# Patient Record
Sex: MALE | Race: WHITE | NOT HISPANIC OR LATINO | Employment: FULL TIME | URBAN - METROPOLITAN AREA
[De-identification: names, ages, dates, MRNs, and addresses within clinical notes are randomized per-mention and may not be internally consistent; named-entity substitution may affect disease eponyms.]

---

## 2021-03-26 DIAGNOSIS — R06.02 SOB (SHORTNESS OF BREATH): Primary | ICD-10-CM

## 2021-03-26 PROCEDURE — 94726 PLETHYSMOGRAPHY LUNG VOLUMES: CPT | Performed by: INTERNAL MEDICINE

## 2021-03-26 PROCEDURE — 94375 RESPIRATORY FLOW VOLUME LOOP: CPT | Performed by: INTERNAL MEDICINE

## 2021-03-26 PROCEDURE — 94729 DIFFUSING CAPACITY: CPT | Performed by: INTERNAL MEDICINE

## 2021-03-30 ENCOUNTER — OFFICE VISIT (OUTPATIENT)
Dept: ALLERGY | Facility: OTHER | Age: 56
End: 2021-03-30
Payer: COMMERCIAL

## 2021-03-30 ENCOUNTER — MYC MEDICAL ADVICE (OUTPATIENT)
Dept: ALLERGY | Facility: CLINIC | Age: 56
End: 2021-03-30

## 2021-03-30 ENCOUNTER — ANCILLARY PROCEDURE (OUTPATIENT)
Dept: GENERAL RADIOLOGY | Facility: OTHER | Age: 56
End: 2021-03-30
Attending: ALLERGY & IMMUNOLOGY
Payer: COMMERCIAL

## 2021-03-30 VITALS
HEIGHT: 70 IN | OXYGEN SATURATION: 98 % | BODY MASS INDEX: 27.92 KG/M2 | SYSTOLIC BLOOD PRESSURE: 120 MMHG | DIASTOLIC BLOOD PRESSURE: 84 MMHG | WEIGHT: 195 LBS | HEART RATE: 66 BPM

## 2021-03-30 DIAGNOSIS — J30.9 ALLERGIC RHINITIS, UNSPECIFIED SEASONALITY, UNSPECIFIED TRIGGER: ICD-10-CM

## 2021-03-30 DIAGNOSIS — R06.02 SHORTNESS OF BREATH: Primary | ICD-10-CM

## 2021-03-30 DIAGNOSIS — R13.19 OTHER DYSPHAGIA: ICD-10-CM

## 2021-03-30 DIAGNOSIS — R06.02 SHORTNESS OF BREATH: ICD-10-CM

## 2021-03-30 LAB
DLCOUNC-%PRED-PRE: 115 %
DLCOUNC-PRE: 33.8 ML/MIN/MMHG
DLCOUNC-PRED: 29.3 ML/MIN/MMHG
ERV-%PRED-PRE: 26 %
ERV-PRE: 0.34 L
ERV-PRED: 1.26 L
EXPTIME-PRE: 6.68 SEC
FEF2575-%PRED-PRE: 106 %
FEF2575-PRE: 3.66 L/SEC
FEF2575-PRED: 3.45 L/SEC
FEFMAX-%PRED-PRE: 97 %
FEFMAX-PRE: 9.53 L/SEC
FEFMAX-PRED: 9.79 L/SEC
FEV1-%PRED-PRE: 90 %
FEV1-PRE: 3.55 L
FEV1FEV6-PRE: 82 %
FEV1FEV6-PRED: 80 %
FEV1FVC-PRE: 84 %
FEV1FVC-PRED: 79 %
FEV1SVC-PRE: 77 %
FEV1SVC-PRED: 74 %
FIFMAX-PRE: 6.56 L/SEC
FRCPLETH-%PRED-PRE: 76 %
FRCPLETH-PRE: 2.75 L
FRCPLETH-PRED: 3.58 L
FVC-%PRED-PRE: 84 %
FVC-PRE: 4.2 L
FVC-PRED: 4.98 L
IC-%PRED-PRE: 107 %
IC-PRE: 4.29 L
IC-PRED: 3.98 L
RVPLETH-%PRED-PRE: 105 %
RVPLETH-PRE: 2.41 L
RVPLETH-PRED: 2.28 L
TLCPLETH-%PRED-PRE: 97 %
TLCPLETH-PRE: 7.04 L
TLCPLETH-PRED: 7.23 L
VA-%PRED-PRE: 100 %
VA-PRE: 6.77 L
VC-%PRED-PRE: 88 %
VC-PRE: 4.62 L
VC-PRED: 5.24 L

## 2021-03-30 PROCEDURE — 71046 X-RAY EXAM CHEST 2 VIEWS: CPT | Performed by: RADIOLOGY

## 2021-03-30 PROCEDURE — 99204 OFFICE O/P NEW MOD 45 MIN: CPT | Performed by: ALLERGY & IMMUNOLOGY

## 2021-03-30 RX ORDER — LACTOBACILLUS RHAMNOSUS GG 10B CELL
1 CAPSULE ORAL 2 TIMES DAILY
COMMUNITY

## 2021-03-30 RX ORDER — ALBUTEROL SULFATE 90 UG/1
AEROSOL, METERED RESPIRATORY (INHALATION)
COMMUNITY
Start: 2020-11-07

## 2021-03-30 RX ORDER — IBUPROFEN 200 MG
TABLET ORAL
COMMUNITY
Start: 2020-10-01

## 2021-03-30 RX ORDER — POLYETHYLENE GLYCOL 3350 17 G/17G
1 POWDER, FOR SOLUTION ORAL DAILY
COMMUNITY

## 2021-03-30 RX ORDER — CETIRIZINE HYDROCHLORIDE 10 MG/1
10 TABLET ORAL
COMMUNITY

## 2021-03-30 RX ORDER — ALBUTEROL SULFATE 0.83 MG/ML
2.5 SOLUTION RESPIRATORY (INHALATION) EVERY 4 HOURS PRN
Qty: 90 ML | Refills: 3 | Status: SHIPPED | OUTPATIENT
Start: 2021-03-30

## 2021-03-30 ASSESSMENT — MIFFLIN-ST. JEOR: SCORE: 1735.76

## 2021-03-30 NOTE — LETTER
3/30/2021         RE: Cody Swift  [address] [address]        Dear Colleague,    Thank you for referring your patient, Cody Swift, to the [facility]. Please see a copy of my visit note below.    Cody Swift is a 55 year old White male with previous medical history significant for hernia, allergic rhinitis and asthma. Cody Swift is being seen today for evaluation of asthma, seasonal allergies and dysphagia. The patient is accompanied by spouse. The spouse helped provide the history.       Historically in the first part of the year the patient has had hoarseness and shortness of breath.  Symptoms will persist for 3 months approximately and then resolve.  He will have associated dry cough.  Denies wheezing.  Has used albuterol for cough and shortness of breath and this has been effective for approximately 2 hours.  He denies postnasal drainage, congestion, sneezing, sinus pressure.  He takes Zyrtec daily.  Historically he has had perennial nasal allergy symptoms.  Prior allergy testing was done numerous years ago which was positive for dust mites, molds, trees, grasses, weeds, dogs.  Has been on allergen immunotherapy.  Currently he feels like Zyrtec controls the majority of his symptoms.  Chest x-ray in fall 2020 which was normal.  He had Covid in fall 2020.  He has felt fatigued.  No use of ICS or ICS/LABA.  He did have complete pulmonary function studies which were normal.  Bronchodilator was not given.  Lastly 1-2 times per week he will have a sensation of difficulty swallowing and he will have to consume water to get food down.  He does not take anything for acid reflux.    ENVIRONMENTAL HISTORY: [place] The home is heated with a forced air. They do have central air conditioning. The patient's bedroom is furnished with carpeting in bedroom.  Pets inside the house include 0  pets, only pet fish. There is no history of cockroach or mice infestation. There is/are 0 smokers in the house.  The house does not have a damp basement.     History reviewed. No pertinent past medical history.  History reviewed. No pertinent family history.  History reviewed. No pertinent surgical history.    REVIEW OF SYSTEMS:  General: negative for weight gain. negative for weight loss. negative for changes in sleep.   Ears: negative for fullness. negative for hearing loss. negative for dizziness.   Nose: negative for snoring.negative for changes in smell. negative for drainage.   Eyes: negative for eye watering. negative for eye itching. negative for vision changes. negative for eye redness.  Throat: positive  for hoarseness. negative for sore throat. negative for trouble swallowing.   Lungs: positive  for shortness of breath.negative for wheezing. negative for sputum production.   Cardiovascular: negative for chest pain. negative for swelling of ankles. negative for fast or irregular heartbeat.   Gastrointestinal: negative for nausea. negative for heartburn. negative for acid reflux.   Musculoskeletal: negative for joint pain. negative for joint stiffness. negative for joint swelling.   Neurologic: negative for seizures. negative for fainting. negative for weakness.   Psychiatric: negative for changes in mood. negative for anxiety.   Endocrine: negative for cold intolerance. negative for heat intolerance. negative for tremors.   Lymphatic: negative for lower extremity swelling. negative for lymph node swelling.   Hematologic: negative for easy bruising. negative for easy bleeding.  Integumentary: negative for rash. negative for scaling. negative for nail changes.       Current Outpatient Medications:      albuterol (PROAIR HFA/PROVENTIL HFA/VENTOLIN HFA) 108 (90 Base) MCG/ACT inhaler, INHALE 1 TO 2 PUFFS BY MOUTH EVERY 4 HOURS AS NEEDED FOR SHORTNESS OF BREATH AND FOR COUGH OR WITH EXERCISE, Disp: , Rfl:       albuterol (PROVENTIL) (2.5 MG/3ML) 0.083% neb solution, Take 1 vial (2.5 mg) by nebulization every 4 hours as needed for shortness of breath / dyspnea or wheezing, Disp: 90 mL, Rfl: 3     cetirizine (ZYRTEC) 10 MG tablet, Take 10 mg by mouth, Disp: , Rfl:      CHLOROPHYLL PO, , Disp: , Rfl:      fluticasone-vilanterol (BREO ELLIPTA) 200-25 MCG/INH inhaler, Inhale 1 puff into the lungs daily, Disp: 1 each, Rfl: 3     ibuprofen (ADVIL/MOTRIN) 200 MG tablet, , Disp: , Rfl:      lactobacillus rhamnosus, GG, (CULTURELL) capsule, Take 1 capsule by mouth 2 times daily, Disp: , Rfl:      MAGNESIUM PO, , Disp: , Rfl:      MELATONIN PO, , Disp: , Rfl:      NEW MED, BL[facility]- young living, Disp: , Rfl:      polyethylene glycol (MIRALAX) 17 g packet, Take 1 packet by mouth daily, Disp: , Rfl:      psyllium (METAMUCIL) 28.3 % packet, Take 1 packet by mouth daily, Disp: , Rfl:      vitamin D3 (CHOLECALCIFEROL) 125 MCG (5000 UT) tablet, Take 5,000 Units by mouth, Disp: , Rfl:   Immunization History   Administered Date(s) Administered     HepA-Adult 06/16/2008, 04/27/2009     Influenza (IIV3) PF 10/03/2007     OPV, trivalent, live 08/04/1980     Allergies   Allergen Reactions     Penicillins Anaphylaxis and Shortness Of Breath     Seasonal Allergies          EXAM:   Constitutional:  Appears well-developed and well-nourished. No distress.   HEENT:   Head: Normocephalic.   Nasal tissue pink and normal appearing.  No cobblestoning of posterior oropharynx.   No rhinorrhea noted.    Eyes: Conjunctivae are non-erythematous   No maxillary or frontal sinus tenderness to palpation.   Cardiovascular: Normal rate, regular rhythm and normal heart sounds. Exam reveals no gallop and no friction rub.   No murmur heard.  Respiratory: Effort normal and breath sounds normal. No respiratory distress. No wheezes. No rales.   Musculoskeletal: Normal range of motion.   Neuro: Oriented to person, place, and time.  Skin: Skin is warm and dry. No rash noted.    Psychiatric: Normal mood and affect.     Nursing note and vitals reviewed.    ASSESSMENT/PLAN:  Problem List Items Addressed This Visit        Respiratory    Shortness of breath - Primary     Recent shortness of breath, coughing.  Occurs for multiple years starting in January for approximately 3 months.  No clear triggers.  Albuterol has been used via nebulizer and helpful for approximately 2 hours.  Had asthma in childhood.  Had allergic rhinoconjunctivitis previously.  Chest x-ray and fall was normal.  PFTs recently done normal.    -Repeat chest x-ray.  -Trial of Breo 200/25 mcg 1 puff inhaled daily.  Doing this as family thinks albuterol has been somewhat beneficial despite normal PFT.  -Albuterol 2-4 puffs inhaled (use a spacer unless using a Proair Respiclick device) every 4 hours as needed for chest tightness, wheezing, shortness of breath and/or coughing.   -If he remains symptomatic would trial treatment with PPI and sent for EGD.  Discussed EGD with patient in depth today given difficulty swallowing but they declined at the moment.         Relevant Medications    albuterol (PROAIR HFA/PROVENTIL HFA/VENTOLIN HFA) 108 (90 Base) MCG/ACT inhaler    cetirizine (ZYRTEC) 10 MG tablet    fluticasone-vilanterol (BREO ELLIPTA) 200-25 MCG/INH inhaler    albuterol (PROVENTIL) (2.5 MG/3ML) 0.083% neb solution    Other Relevant Orders    CHEST X-RAY 2 VW [[id number]]    Allergic rhinitis, unspecified seasonality, unspecified trigger     Historically perennial allergy symptoms.  Well controlled with Zyrtec.  Allergy testing years ago positive for dust mites, molds, trees, grass, weeds and dogs.  No recent testing.  Offered repeat testing.  They will potentially get off antihistamines and repeat testing when they return in 4 weeks.  Otherwise use Zyrtec daily.         Relevant Medications    albuterol (PROAIR HFA/PROVENTIL HFA/VENTOLIN HFA) 108 (90 Base) MCG/ACT inhaler    cetirizine (ZYRTEC) 10 MG tablet     fluticasone-vilanterol (BREO ELLIPTA) 200-25 MCG/INH inhaler    albuterol (PROVENTIL) (2.5 MG/3ML) 0.083% neb solution       Digestive    Other dysphagia     1-2 times per week difficulty swallowing solid foods.  Food passes with water.  They will trial Tums.  Discussed EGD and they will consider.               Chart documentation with Dragon Voice recognition Software. Although reviewed after completion, some words and grammatical errors may remain.    Homero Henry DO FAAAAI  Medical Director for Allergy/Immunology at [place], [place]        Again, thank you for allowing me to participate in the care of your patient.        Sincerely,        Homero Henry DO

## 2021-03-30 NOTE — PATIENT INSTRUCTIONS
Allergy Staff Appt Hours Shot Hours Locations    Physician     Homero Henry DO       Support Staff     ALEXANDRE Obando CMA  Tuesday:        [place] 7-5 Wednesday:        [place]: 7-5 Thursday:                    [place] 7-6     Friday:  [place]  7-2   [place]        Thursday: 8-5:20        Friday: 7-12     [place]        Tuesday: 7- 3:20 Wednesday: 7-4:20 [place] Monday: 7-2:20 Tuesday: 9-5:20         [facility]  [address] [place] [address]  Appt Line: [phone]  Allergy RN:  [phone]    [facility]  [address] [place] [address]  Appt Line: [phone]  Allergy RN:  [phone]       Important Scheduling Information  Aspirin Desensitization: Appt will last 2 clinic days. Please call the Allergy RN line for your clinic to schedule. Discontinue antihistamines 7 days prior to the appointment.     Food Challenges: Appt will last 3-4 hours. Please call the Allergy RN line for your clinic to schedule. Discontinue antihistamines 7 days prior to the appointment.     Penicillin Testing: Appt will last 2-3 hours. Please call the Allergy RN line for your clinic to schedule. Discontinue antihistamines 7 days prior to the appointment.     Skin Testing: Appt will about 40 minutes. Call the appointment line for your clinic to schedule. Discontinue antihistamines 7 days prior to the appointment.     Venom Testing: Appt will last 2-3 hours. Please call the Allergy RN line for your clinic to schedule. Discontinue antihistamines 7 days prior to the appointment.     Thank you for trusting us with your Allergy, Asthma, and Immunology care. Please feel free to contact us with any questions or concerns you may have.      - Breo 200/25mcg 1 puff inhaled daily.  - Albuterol 2-4 puffs inhaled (use a spacer unless using a Proair Respiclick device) every 4 hours as needed for chest tightness, wheezing, shortness of breath and/or coughing.   -  Albuterol nebulized treatment every 4 hours as needed for chest tightness, wheezing, coughing and/or shortness of breath.   - Continue Zyrtec 10mg daily. Hold if you want allergy testing.   - Chest x-ray today.

## 2021-03-30 NOTE — RESULT ENCOUNTER NOTE
Chest x-ray looked okay. No evidence of pneumonia. Atelectasis is generally just an area of a little bit of small collapsed lung tissue. Plan remains the same as discussed in clinic. Thanks.     Dr. Henry

## 2021-03-30 NOTE — PROGRESS NOTES
Cody Swift is a 55 year old White male with previous medical history significant for hernia, allergic rhinitis and asthma. Cody Swift is being seen today for evaluation of asthma, seasonal allergies and dysphagia. The patient is accompanied by spouse. The spouse helped provide the history.       Historically in the first part of the year the patient has had hoarseness and shortness of breath.  Symptoms will persist for 3 months approximately and then resolve.  He will have associated dry cough.  Denies wheezing.  Has used albuterol for cough and shortness of breath and this has been effective for approximately 2 hours.  He denies postnasal drainage, congestion, sneezing, sinus pressure.  He takes Zyrtec daily.  Historically he has had perennial nasal allergy symptoms.  Prior allergy testing was done numerous years ago which was positive for dust mites, molds, trees, grasses, weeds, dogs.  Has been on allergen immunotherapy.  Currently he feels like Zyrtec controls the majority of his symptoms.  Chest x-ray in fall 2020 which was normal.  He had Covid in fall 2020.  He has felt fatigued.  No use of ICS or ICS/LABA.  He did have complete pulmonary function studies which were normal.  Bronchodilator was not given.  Lastly 1-2 times per week he will have a sensation of difficulty swallowing and he will have to consume water to get food down.  He does not take anything for acid reflux.    ENVIRONMENTAL HISTORY: [place] The home is heated with a forced air. They do have central air conditioning. The patient's bedroom is furnished with carpeting in bedroom.  Pets inside the house include 0 pets, only pet fish. There is no history of cockroach or mice infestation. There is/are 0 smokers in the house.  The house does not have a damp basement.     History reviewed. No pertinent past medical history.  History reviewed. No pertinent family history.  History  reviewed. No pertinent surgical history.    REVIEW OF SYSTEMS:  General: negative for weight gain. negative for weight loss. negative for changes in sleep.   Ears: negative for fullness. negative for hearing loss. negative for dizziness.   Nose: negative for snoring.negative for changes in smell. negative for drainage.   Eyes: negative for eye watering. negative for eye itching. negative for vision changes. negative for eye redness.  Throat: positive  for hoarseness. negative for sore throat. negative for trouble swallowing.   Lungs: positive  for shortness of breath.negative for wheezing. negative for sputum production.   Cardiovascular: negative for chest pain. negative for swelling of ankles. negative for fast or irregular heartbeat.   Gastrointestinal: negative for nausea. negative for heartburn. negative for acid reflux.   Musculoskeletal: negative for joint pain. negative for joint stiffness. negative for joint swelling.   Neurologic: negative for seizures. negative for fainting. negative for weakness.   Psychiatric: negative for changes in mood. negative for anxiety.   Endocrine: negative for cold intolerance. negative for heat intolerance. negative for tremors.   Lymphatic: negative for lower extremity swelling. negative for lymph node swelling.   Hematologic: negative for easy bruising. negative for easy bleeding.  Integumentary: negative for rash. negative for scaling. negative for nail changes.       Current Outpatient Medications:      albuterol (PROAIR HFA/PROVENTIL HFA/VENTOLIN HFA) 108 (90 Base) MCG/ACT inhaler, INHALE 1 TO 2 PUFFS BY MOUTH EVERY 4 HOURS AS NEEDED FOR SHORTNESS OF BREATH AND FOR COUGH OR WITH EXERCISE, Disp: , Rfl:      albuterol (PROVENTIL) (2.5 MG/3ML) 0.083% neb solution, Take 1 vial (2.5 mg) by nebulization every 4 hours as needed for shortness of breath / dyspnea or wheezing, Disp: 90 mL, Rfl: 3     cetirizine (ZYRTEC) 10 MG tablet, Take 10 mg by mouth, Disp: , Rfl:       CHLOROPHYLL PO, , Disp: , Rfl:      fluticasone-vilanterol (BREO ELLIPTA) 200-25 MCG/INH inhaler, Inhale 1 puff into the lungs daily, Disp: 1 each, Rfl: 3     ibuprofen (ADVIL/MOTRIN) 200 MG tablet, , Disp: , Rfl:      lactobacillus rhamnosus, GG, (CULTURELL) capsule, Take 1 capsule by mouth 2 times daily, Disp: , Rfl:      MAGNESIUM PO, , Disp: , Rfl:      MELATONIN PO, , Disp: , Rfl:      [facility], [facility]- young living, Disp: , Rfl:      polyethylene glycol (MIRALAX) 17 g packet, Take 1 packet by mouth daily, Disp: , Rfl:      psyllium (METAMUCIL) 28.3 % packet, Take 1 packet by mouth daily, Disp: , Rfl:      vitamin D3 (CHOLECALCIFEROL) 125 MCG (5000 UT) tablet, Take 5,000 Units by mouth, Disp: , Rfl:   Immunization History   Administered Date(s) Administered     HepA-Adult 06/16/2008, 04/27/2009     Influenza (IIV3) PF 10/03/2007     OPV, trivalent, live 08/04/1980     Allergies   Allergen Reactions     Penicillins Anaphylaxis and Shortness Of Breath     Seasonal Allergies          EXAM:   Constitutional:  Appears well-developed and well-nourished. No distress.   HEENT:   Head: Normocephalic.   Nasal tissue pink and normal appearing.  No cobblestoning of posterior oropharynx.   No rhinorrhea noted.    Eyes: Conjunctivae are non-erythematous   No maxillary or frontal sinus tenderness to palpation.   Cardiovascular: Normal rate, regular rhythm and normal heart sounds. Exam reveals no gallop and no friction rub.   No murmur heard.  Respiratory: Effort normal and breath sounds normal. No respiratory distress. No wheezes. No rales.   Musculoskeletal: Normal range of motion.   Neuro: Oriented to person, place, and time.  Skin: Skin is warm and dry. No rash noted.   Psychiatric: Normal mood and affect.     Nursing note and vitals reviewed.    ASSESSMENT/PLAN:  Problem List Items Addressed This Visit        Respiratory    Shortness of breath - Primary     Recent shortness of breath, coughing.  Occurs for multiple years  starting in January for approximately 3 months.  No clear triggers.  Albuterol has been used via nebulizer and helpful for approximately 2 hours.  Had asthma in childhood.  Had allergic rhinoconjunctivitis previously.  Chest x-ray and fall was normal.  PFTs recently done normal.    -Repeat chest x-ray.  -Trial of Breo 200/25 mcg 1 puff inhaled daily.  Doing this as family thinks albuterol has been somewhat beneficial despite normal PFT.  -Albuterol 2-4 puffs inhaled (use a spacer unless using a Proair Respiclick device) every 4 hours as needed for chest tightness, wheezing, shortness of breath and/or coughing.   -If he remains symptomatic would trial treatment with PPI and sent for EGD.  Discussed EGD with patient in depth today given difficulty swallowing but they declined at the moment.         Relevant Medications    albuterol (PROAIR HFA/PROVENTIL HFA/VENTOLIN HFA) 108 (90 Base) MCG/ACT inhaler    cetirizine (ZYRTEC) 10 MG tablet    fluticasone-vilanterol (BREO ELLIPTA) 200-25 MCG/INH inhaler    albuterol (PROVENTIL) (2.5 MG/3ML) 0.083% neb solution    Other Relevant Orders    CHEST X-RAY 2 VW [[id number]]    Allergic rhinitis, unspecified seasonality, unspecified trigger     Historically perennial allergy symptoms.  Well controlled with Zyrtec.  Allergy testing years ago positive for dust mites, molds, trees, grass, weeds and dogs.  No recent testing.  Offered repeat testing.  They will potentially get off antihistamines and repeat testing when they return in 4 weeks.  Otherwise use Zyrtec daily.         Relevant Medications    albuterol (PROAIR HFA/PROVENTIL HFA/VENTOLIN HFA) 108 (90 Base) MCG/ACT inhaler    cetirizine (ZYRTEC) 10 MG tablet    fluticasone-vilanterol (BREO ELLIPTA) 200-25 MCG/INH inhaler    albuterol (PROVENTIL) (2.5 MG/3ML) 0.083% neb solution       Digestive    Other dysphagia     1-2 times per week difficulty swallowing solid foods.  Food passes with water.  They will trial Tums.  Discussed EGD  and they will consider.               Chart documentation with Dragon Voice recognition Software. Although reviewed after completion, some words and grammatical errors may remain.    DO CHERYL McelroyI  Medical Director for Allergy/Immunology at [place], [place]

## 2021-03-30 NOTE — ASSESSMENT & PLAN NOTE
Historically perennial allergy symptoms.  Well controlled with Zyrtec.  Allergy testing years ago positive for dust mites, molds, trees, grass, weeds and dogs.  No recent testing.  Offered repeat testing.  They will potentially get off antihistamines and repeat testing when they return in 4 weeks.  Otherwise use Zyrtec daily.

## 2021-03-30 NOTE — ASSESSMENT & PLAN NOTE
Recent shortness of breath, coughing.  Occurs for multiple years starting in January for approximately 3 months.  No clear triggers.  Albuterol has been used via nebulizer and helpful for approximately 2 hours.  Had asthma in childhood.  Had allergic rhinoconjunctivitis previously.  Chest x-ray and fall was normal.  PFTs recently done normal.    -Repeat chest x-ray.  -Trial of Breo 200/25 mcg 1 puff inhaled daily.  Doing this as family thinks albuterol has been somewhat beneficial despite normal PFT.  -Albuterol 2-4 puffs inhaled (use a spacer unless using a Proair Respiclick device) every 4 hours as needed for chest tightness, wheezing, shortness of breath and/or coughing.   -If he remains symptomatic would trial treatment with PPI and sent for EGD.  Discussed EGD with patient in depth today given difficulty swallowing but they declined at the moment.

## 2021-03-31 ENCOUNTER — MYC MEDICAL ADVICE (OUTPATIENT)
Dept: ALLERGY | Facility: CLINIC | Age: 56
End: 2021-03-31

## 2021-03-31 DIAGNOSIS — R06.02 SHORTNESS OF BREATH: Primary | ICD-10-CM

## 2021-03-31 NOTE — TELEPHONE ENCOUNTER
Routing to provider. Breo is covered under patient insurance plan but expensive. Ok to send generic Advair or something affordable for patient?       Modesta Baron MA

## 2021-04-23 ENCOUNTER — MYC MEDICAL ADVICE (OUTPATIENT)
Dept: ALLERGY | Facility: CLINIC | Age: 56
End: 2021-04-23

## 2021-04-23 ENCOUNTER — OFFICE VISIT (OUTPATIENT)
Dept: ALLERGY | Facility: CLINIC | Age: 56
End: 2021-04-23
Payer: COMMERCIAL

## 2021-04-23 VITALS
OXYGEN SATURATION: 98 % | WEIGHT: 195 LBS | SYSTOLIC BLOOD PRESSURE: 124 MMHG | HEIGHT: 70 IN | BODY MASS INDEX: 27.92 KG/M2 | HEART RATE: 63 BPM | DIASTOLIC BLOOD PRESSURE: 83 MMHG

## 2021-04-23 DIAGNOSIS — J30.9 ALLERGIC RHINITIS, UNSPECIFIED SEASONALITY, UNSPECIFIED TRIGGER: ICD-10-CM

## 2021-04-23 DIAGNOSIS — R49.0 HOARSENESS: ICD-10-CM

## 2021-04-23 DIAGNOSIS — R06.02 SHORTNESS OF BREATH: Primary | ICD-10-CM

## 2021-04-23 PROCEDURE — 99213 OFFICE O/P EST LOW 20 MIN: CPT | Performed by: ALLERGY & IMMUNOLOGY

## 2021-04-23 RX ORDER — BUDESONIDE AND FORMOTEROL FUMARATE DIHYDRATE 80; 4.5 UG/1; UG/1
2 AEROSOL RESPIRATORY (INHALATION) 2 TIMES DAILY
COMMUNITY

## 2021-04-23 RX ORDER — FLUTICASONE PROPIONATE 50 MCG
2 SPRAY, SUSPENSION (ML) NASAL DAILY
Qty: 16 G | Refills: 3 | Status: SHIPPED | OUTPATIENT
Start: 2021-04-23

## 2021-04-23 ASSESSMENT — PAIN SCALES - GENERAL: PAINLEVEL: NO PAIN (0)

## 2021-04-23 ASSESSMENT — MIFFLIN-ST. JEOR: SCORE: 1735.76

## 2021-04-23 NOTE — TELEPHONE ENCOUNTER
Routing to provider as an FYI- patient is scheduled a return visit today 4/23. We can also give lab a heads up if labs are needed.        Modesta Baron MA

## 2021-04-23 NOTE — LETTER
4/23/2021         RE: Cody Swift  [address] [facility] [id number]        Dear Colleague,    Thank you for referring your patient, Cody Swift, to the [facility]. Please see a copy of my visit note below.    Cody Swift is a 55 year old White male with previous medical history significant for allergic rhinitis and shortness of breah who returns for a follow up visit.     Patient returns for follow-up.  At last visit I asked him to get complete pulmonary function studies which she did which were normal.  He additionally had a chest x-ray which showed atelectasis.  He was prescribed initially Breo but this was too expensive.  Prescribed Advair and this was again too expensive.  He somehow got a sample of Symbicort and has been using 80/4.5 mcg 2 puff inhaled daily.  He thinks this has been helpful and that he can take a deeper breath.  He continues to cough up phlegm.  He continues to experience hoarseness.  In the past albuterol nebs have been beneficial.  He denies any congestion, sneezing, nasal itching or postnasal drainage.  As mentioned he continues to have hoarseness.  He denies symptoms of acid reflux.  He has not tried any PPI or H2 blockers.  At last visit he was complaining of difficulty swallowing.  We had discussed an EGD.  Opted not to proceed forward with.  Difficulty swallowing has improved.  In the past he had allergy positivity for dust mites, molds, trees, grass, weeds and dogs.  Symptoms of shortness of breath, coughing and hoarseness in the past was in the late winter and early spring and typically resolves.      No past medical history on file.  No family history on file.  No past surgical history on file.    REVIEW OF SYSTEMS:  Nose: negative for snoring.negative for changes in smell. negative for drainage.   Eyes: negative for eye watering. negative for eye itching. negative for vision changes. negative for eye redness.  Throat:  negative for hoarseness. negative for sore throat. negative for trouble swallowing.   Lungs: negative for shortness of breath.negative for wheezing. positive  for sputum production.   Integumentary: negative for rash. negative for scaling. negative for nail changes.       Current Outpatient Medications:      albuterol (PROAIR HFA/PROVENTIL HFA/VENTOLIN HFA) 108 (90 Base) MCG/ACT inhaler, INHALE 1 TO 2 PUFFS BY MOUTH EVERY 4 HOURS AS NEEDED FOR SHORTNESS OF BREATH AND FOR COUGH OR WITH EXERCISE, Disp: , Rfl:      albuterol (PROVENTIL) (2.5 MG/3ML) 0.083% neb solution, Take 1 vial (2.5 mg) by nebulization every 4 hours as needed for shortness of breath / dyspnea or wheezing, Disp: 90 mL, Rfl: 3     budesonide-formoterol (SYMBICORT) 80-4.5 MCG/ACT Inhaler, Inhale 2 puffs into the lungs 2 times daily, Disp: , Rfl:      cetirizine (ZYRTEC) 10 MG tablet, Take 10 mg by mouth, Disp: , Rfl:      CHLOROPHYLL PO, , Disp: , Rfl:      fluticasone (FLONASE) 50 MCG/ACT nasal spray, Spray 2 sprays into both nostrils daily, Disp: 16 g, Rfl: 3     ibuprofen (ADVIL/MOTRIN) 200 MG tablet, , Disp: , Rfl:      lactobacillus rhamnosus, GG, (CULTURELL) capsule, Take 1 capsule by mouth 2 times daily, Disp: , Rfl:      MAGNESIUM PO, , Disp: , Rfl:      MELATONIN PO, , Disp: , Rfl:      NEW MED, [facility], Disp: , Rfl:      polyethylene glycol (MIRALAX) 17 g packet, Take 1 packet by mouth daily, Disp: , Rfl:      psyllium (METAMUCIL) 28.3 % packet, Take 1 packet by mouth daily, Disp: , Rfl:      vitamin D3 (CHOLECALCIFEROL) 125 MCG (5000 UT) tablet, Take 5,000 Units by mouth, Disp: , Rfl:      fluticasone-salmeterol (ADVAIR) 250-50 MCG/DOSE inhaler, Inhale 1 puff into the lungs every 12 hours (Patient not taking: Reported on 4/23/2021), Disp: 1 each, Rfl: 3     fluticasone-vilanterol (BREO ELLIPTA) 200-25 MCG/INH inhaler, Inhale 1 puff into the lungs daily (Patient not taking: Reported on 4/23/2021), Disp: 1 each, Rfl: 3  Immunization  History   Administered Date(s) Administered     HepA-Adult 06/16/2008, 04/27/2009     Influenza (IIV3) PF 10/03/2007     OPV, trivalent, live 08/04/1980     Allergies   Allergen Reactions     Penicillins Anaphylaxis and Shortness Of Breath     Seasonal Allergies          EXAM:   Constitutional:  Appears well-developed and well-nourished. No distress.   HEENT:   Head: Normocephalic.   Nasal tissue pink and normal appearing.  No rhinorrhea noted.    Eyes: Conjunctivae are non-erythematous   Cardiovascular: Normal rate, regular rhythm and normal heart sounds. Exam reveals no gallop and no friction rub.   No murmur heard.  Respiratory: Effort normal and breath sounds normal. No respiratory distress. No wheezes. No rales.   Musculoskeletal: Normal range of motion.   Neuro: Oriented to person, place, and time.  Skin: Skin is warm and dry. No rash noted.   Psychiatric: Normal mood and affect.     Nursing note and vitals reviewed.    ASSESSMENT/PLAN:  Problem List Items Addressed This Visit        Respiratory    Shortness of breath - Primary     Recent shortness of breath, coughing.  Thinks he can take a deeper breath with Symbicort 80/4.5mcg 2 puffs daily which he has been on for 2 weeks. Occurs for multiple years starting in January for approximately 3 months.  No clear triggers.  Albuterol has been used via nebulizer and helpful for approximately 2 hours.  Chest x-ray showed atelectasis.  PFTs recently done normal.     -Increase Symbicort to 80/4.5mcg 2 puffs twice daily.   -Albuterol 2-4 puffs inhaled (use a spacer unless using a Proair Respiclick device) every 4 hours as needed for chest tightness, wheezing, shortness of breath and/or coughing.   -If he remains symptomatic would trial treatment with PPI and sent for EGD.   -When he returns to clinic we will skin test and do allergy evaluation.         Relevant Medications    budesonide-formoterol (SYMBICORT) 80-4.5 MCG/ACT Inhaler    fluticasone (FLONASE) 50 MCG/ACT  nasal spray    Allergic rhinitis, unspecified seasonality, unspecified trigger     Historically perennial allergy symptoms.  Well controlled with Zyrtec.  Allergy testing years ago positive for dust mites, molds, trees, grass, weeds and dogs.  No recent testing.  Offered repeat testing either via blood testing or skin testing at future appointment when off of antihistamines. He wishes to proceed with skin testing. Will have return in 3-4 weeks for testing. Additionally will have him start Flonase 2 sprays/nostril daily. Ascertain if helpful for hoarseness. He can trial pepcid prn to determine if helpful for hoarseness.           Relevant Medications    budesonide-formoterol (SYMBICORT) 80-4.5 MCG/ACT Inhaler    fluticasone (FLONASE) 50 MCG/ACT nasal spray       Other    Hoarseness    Relevant Medications    fluticasone (FLONASE) 50 MCG/ACT nasal spray        Return in 4 weeks.       Chart documentation with Dragon Voice recognition Software. Although reviewed after completion, some words and grammatical errors may remain.    Homero Henry DO FAAAAI  Medical Director for Allergy/Immunology at [place], [place]        Again, thank you for allowing me to participate in the care of your patient.        Sincerely,        Homero Henry DO

## 2021-04-23 NOTE — PATIENT INSTRUCTIONS
Allergy Staff Appt Hours Shot Hours Locations    Physician     Homero Henry DO       Support Staff     Jaylyn NAIR RN      Modesta MUÑOZ CMA  Tuesday:        [place] 7-5 Wednesday:        [place]: 7-5 Thursday:                    [place] 7-6     Friday:  [place]  7-2   [place]        Thursday: 8-5:20        Friday: 7-12     [place]        Tuesday: 7- 3:20 Wednesday: 7-4:20     [place] Monday: 7-2:20 Tuesday: 9-5:20         [facility]  [address] [place] [address]  Appt Line: [phone]  Allergy RN:  [phone]    [facility]  [address] [place] [address]  Appt Line: [phone]  Allergy RN:  [phone]       Important Scheduling Information  Aspirin Desensitization: Appt will last 2 clinic days. Please call the Allergy RN line for your clinic to schedule. Discontinue antihistamines 7 days prior to the appointment.     Food Challenges: Appt will last 3-4 hours. Please call the Allergy RN line for your clinic to schedule. Discontinue antihistamines 7 days prior to the appointment.     Penicillin Testing: Appt will last 2-3 hours. Please call the Allergy RN line for your clinic to schedule. Discontinue antihistamines 7 days prior to the appointment.     Skin Testing: Appt will about 40 minutes. Call the appointment line for your clinic to schedule. Discontinue antihistamines 7 days prior to the appointment.     Venom Testing: Appt will last 2-3 hours. Please call the Allergy RN line for your clinic to schedule. Discontinue antihistamines 7 days prior to the appointment.     Thank you for trusting us with your Allergy, Asthma, and Immunology care. Please feel free to contact us with any questions or concerns you may have.      - Flonase 2 sprays/nostril daily.   - Zyrtec 10mg daily. However, hold for 7 days prior to return appointment.   - Symbicort 80/4.5mcg 2 puffs twice daily.   - Albuterol nebulized treatment every 4 hours as needed for chest  tightness, wheezing, coughing and/or shortness of breath.   - Albuterol 2-4 puffs inhaled (use a spacer unless using a Proair Respiclick device) every 4 hours as needed for chest tightness, wheezing, shortness of breath and/or coughing.

## 2021-04-23 NOTE — PROGRESS NOTES
Cody Swift is a 55 year old White male with previous medical history significant for allergic rhinitis and shortness of breah who returns for a follow up visit.     Patient returns for follow-up.  At last visit I asked him to get complete pulmonary function studies which she did which were normal.  He additionally had a chest x-ray which showed atelectasis.  He was prescribed initially Breo but this was too expensive.  Prescribed Advair and this was again too expensive.  He somehow got a sample of Symbicort and has been using 80/4.5 mcg 2 puff inhaled daily.  He thinks this has been helpful and that he can take a deeper breath.  He continues to cough up phlegm.  He continues to experience hoarseness.  In the past albuterol nebs have been beneficial.  He denies any congestion, sneezing, nasal itching or postnasal drainage.  As mentioned he continues to have hoarseness.  He denies symptoms of acid reflux.  He has not tried any PPI or H2 blockers.  At last visit he was complaining of difficulty swallowing.  We had discussed an EGD.  Opted not to proceed forward with.  Difficulty swallowing has improved.  In the past he had allergy positivity for dust mites, molds, trees, grass, weeds and dogs.  Symptoms of shortness of breath, coughing and hoarseness in the past was in the late winter and early spring and typically resolves.      No past medical history on file.  No family history on file.  No past surgical history on file.    REVIEW OF SYSTEMS:  Nose: negative for snoring.negative for changes in smell. negative for drainage.   Eyes: negative for eye watering. negative for eye itching. negative for vision changes. negative for eye redness.  Throat: negative for hoarseness. negative for sore throat. negative for trouble swallowing.   Lungs: negative for shortness of breath.negative for wheezing. positive  for sputum production.   Integumentary: negative for rash. negative for scaling. negative for nail  changes.       Current Outpatient Medications:      albuterol (PROAIR HFA/PROVENTIL HFA/VENTOLIN HFA) 108 (90 Base) MCG/ACT inhaler, INHALE 1 TO 2 PUFFS BY MOUTH EVERY 4 HOURS AS NEEDED FOR SHORTNESS OF BREATH AND FOR COUGH OR WITH EXERCISE, Disp: , Rfl:      albuterol (PROVENTIL) (2.5 MG/3ML) 0.083% neb solution, Take 1 vial (2.5 mg) by nebulization every 4 hours as needed for shortness of breath / dyspnea or wheezing, Disp: 90 mL, Rfl: 3     budesonide-formoterol (SYMBICORT) 80-4.5 MCG/ACT Inhaler, Inhale 2 puffs into the lungs 2 times daily, Disp: , Rfl:      cetirizine (ZYRTEC) 10 MG tablet, Take 10 mg by mouth, Disp: , Rfl:      CHLOROPHYLL PO, , Disp: , Rfl:      fluticasone (FLONASE) 50 MCG/ACT nasal spray, Spray 2 sprays into both nostrils daily, Disp: 16 g, Rfl: 3     ibuprofen (ADVIL/MOTRIN) 200 MG tablet, , Disp: , Rfl:      lactobacillus rhamnosus, GG, (CULTURELL) capsule, Take 1 capsule by mouth 2 times daily, Disp: , Rfl:      MAGNESIUM PO, , Disp: , Rfl:      MELATONIN PO, , Disp: , Rfl:      NEW MED, [facility]- [facility] living, Disp: , Rfl:      polyethylene glycol (MIRALAX) 17 g packet, Take 1 packet by mouth daily, Disp: , Rfl:      psyllium (METAMUCIL) 28.3 % packet, Take 1 packet by mouth daily, Disp: , Rfl:      vitamin D3 (CHOLECALCIFEROL) 125 MCG (5000 UT) tablet, Take 5,000 Units by mouth, Disp: , Rfl:      fluticasone-salmeterol (ADVAIR) 250-50 MCG/DOSE inhaler, Inhale 1 puff into the lungs every 12 hours (Patient not taking: Reported on 4/23/2021), Disp: 1 each, Rfl: 3     fluticasone-vilanterol (BREO ELLIPTA) 200-25 MCG/INH inhaler, Inhale 1 puff into the lungs daily (Patient not taking: Reported on 4/23/2021), Disp: 1 each, Rfl: 3  Immunization History   Administered Date(s) Administered     HepA-Adult 06/16/2008, 04/27/2009     Influenza (IIV3) PF 10/03/2007     OPV, trivalent, live 08/04/1980     Allergies   Allergen Reactions     Penicillins Anaphylaxis and Shortness Of Breath     Seasonal  Allergies          EXAM:   Constitutional:  Appears well-developed and well-nourished. No distress.   HEENT:   Head: Normocephalic.   Nasal tissue pink and normal appearing.  No rhinorrhea noted.    Eyes: Conjunctivae are non-erythematous   Cardiovascular: Normal rate, regular rhythm and normal heart sounds. Exam reveals no gallop and no friction rub.   No murmur heard.  Respiratory: Effort normal and breath sounds normal. No respiratory distress. No wheezes. No rales.   Musculoskeletal: Normal range of motion.   Neuro: Oriented to person, place, and time.  Skin: Skin is warm and dry. No rash noted.   Psychiatric: Normal mood and affect.     Nursing note and vitals reviewed.    ASSESSMENT/PLAN:  Problem List Items Addressed This Visit        Respiratory    Shortness of breath - Primary     Recent shortness of breath, coughing.  Thinks he can take a deeper breath with Symbicort 80/4.5mcg 2 puffs daily which he has been on for 2 weeks. Occurs for multiple years starting in January for approximately 3 months.  No clear triggers.  Albuterol has been used via nebulizer and helpful for approximately 2 hours.  Chest x-ray showed atelectasis.  PFTs recently done normal.     -Increase Symbicort to 80/4.5mcg 2 puffs twice daily.   -Albuterol 2-4 puffs inhaled (use a spacer unless using a Proair Respiclick device) every 4 hours as needed for chest tightness, wheezing, shortness of breath and/or coughing.   -If he remains symptomatic would trial treatment with PPI and sent for EGD.   -When he returns to clinic we will skin test and do allergy evaluation.         Relevant Medications    budesonide-formoterol (SYMBICORT) 80-4.5 MCG/ACT Inhaler    fluticasone (FLONASE) 50 MCG/ACT nasal spray    Allergic rhinitis, unspecified seasonality, unspecified trigger     Historically perennial allergy symptoms.  Well controlled with Zyrtec.  Allergy testing years ago positive for dust mites, molds, trees, grass, weeds and dogs.  No recent  testing.  Offered repeat testing either via blood testing or skin testing at future appointment when off of antihistamines. He wishes to proceed with skin testing. Will have return in 3-4 weeks for testing. Additionally will have him start Flonase 2 sprays/nostril daily. Ascertain if helpful for hoarseness. He can trial pepcid prn to determine if helpful for hoarseness.           Relevant Medications    budesonide-formoterol (SYMBICORT) 80-4.5 MCG/ACT Inhaler    fluticasone (FLONASE) 50 MCG/ACT nasal spray       Other    Hoarseness    Relevant Medications    fluticasone (FLONASE) 50 MCG/ACT nasal spray        Return in 4 weeks.       Chart documentation with Dragon Voice recognition Software. Although reviewed after completion, some words and grammatical errors may remain.    Homero Henry DO FAAAAI  Medical Director for Allergy/Immunology at [place], [place]

## 2021-04-23 NOTE — ASSESSMENT & PLAN NOTE
Historically perennial allergy symptoms.  Well controlled with Zyrtec.  Allergy testing years ago positive for dust mites, molds, trees, grass, weeds and dogs.  No recent testing.  Offered repeat testing either via blood testing or skin testing at future appointment when off of antihistamines. He wishes to proceed with skin testing. Will have return in 3-4 weeks for testing. Additionally will have him start Flonase 2 sprays/nostril daily. Ascertain if helpful for hoarseness. He can trial pepcid prn to determine if helpful for hoarseness.

## 2021-04-23 NOTE — ASSESSMENT & PLAN NOTE
Recent shortness of breath, coughing.  Thinks he can take a deeper breath with Symbicort 80/4.5mcg 2 puffs daily which he has been on for 2 weeks. Occurs for multiple years starting in January for approximately 3 months.  No clear triggers.  Albuterol has been used via nebulizer and helpful for approximately 2 hours.  Chest x-ray showed atelectasis.  PFTs recently done normal.     -Increase Symbicort to 80/4.5mcg 2 puffs twice daily.   -Albuterol 2-4 puffs inhaled (use a spacer unless using a Proair Respiclick device) every 4 hours as needed for chest tightness, wheezing, shortness of breath and/or coughing.   -If he remains symptomatic would trial treatment with PPI and sent for EGD.   -When he returns to clinic we will skin test and do allergy evaluation.

## 2021-05-08 ENCOUNTER — HEALTH MAINTENANCE LETTER (OUTPATIENT)
Age: 56
End: 2021-05-08

## 2021-05-10 NOTE — ASSESSMENT & PLAN NOTE
1-2 times per week difficulty swallowing solid foods.  Food passes with water.  They will trial Tums.  Discussed EGD and they will consider.   Body Location Override (Optional - Billing Will Still Be Based On Selected Body Map Location If Applicable): upper back and shoulders Detail Level: Simple Size Of Lesion In Cm (Optional): 0 Detail Level: Zone

## 2021-07-15 ENCOUNTER — TRANSFERRED RECORDS (OUTPATIENT)
Dept: HEALTH INFORMATION MANAGEMENT | Facility: CLINIC | Age: 56
End: 2021-07-15

## 2021-07-19 ENCOUNTER — TRANSFERRED RECORDS (OUTPATIENT)
Dept: HEALTH INFORMATION MANAGEMENT | Facility: CLINIC | Age: 56
End: 2021-07-19

## 2021-10-23 ENCOUNTER — HEALTH MAINTENANCE LETTER (OUTPATIENT)
Age: 56
End: 2021-10-23

## 2022-06-04 ENCOUNTER — HEALTH MAINTENANCE LETTER (OUTPATIENT)
Age: 57
End: 2022-06-04

## 2022-10-09 ENCOUNTER — HEALTH MAINTENANCE LETTER (OUTPATIENT)
Age: 57
End: 2022-10-09

## 2023-01-04 DIAGNOSIS — R49.0 HOARSENESS: ICD-10-CM

## 2023-01-04 NOTE — TELEPHONE ENCOUNTER
Rx Refill  Advair Diskus 250/50MCG 60 AER    Inhale 1 puff by mouth every 12 hours    Epic wouldn't let me enter this prescription  -Rl

## 2023-01-05 RX ORDER — FLUTICASONE PROPIONATE 50 MCG
2 SPRAY, SUSPENSION (ML) NASAL DAILY
Qty: 16 G | Refills: 3 | OUTPATIENT
Start: 2023-01-05

## 2023-01-05 NOTE — TELEPHONE ENCOUNTER
Patient last seen nearly 2 years ago. Please advise him to schedule a follow-up visit to discuss medications.

## 2023-01-05 NOTE — TELEPHONE ENCOUNTER
"Routing refill request to provider for review/approval because:  Patient needs to be seen because it has been more than 1 year since last office visit.    LOV: with Dr. Henry on 4-.  Patient is also requesting fluticasone-salmeterol (ADVAIR) 250-50 MCG/DOSE inhaler    Carri PANDYA RN     Requested Prescriptions   Pending Prescriptions Disp Refills     fluticasone (FLONASE) 50 MCG/ACT nasal spray 16 g 3     Sig: Spray 2 sprays into both nostrils daily       Nasal Allergy Protocol Failed - 1/4/2023  2:01 PM        Failed - Recent (12 mo) or future (30 days) visit within the authorizing provider's specialty     Patient has had an office visit with the authorizing provider or a provider within the authorizing providers department within the previous 12 mos or has a future within next 30 days. See \"Patient Info\" tab in inbasket, or \"Choose Columns\" in Meds & Orders section of the refill encounter.              Passed - Patient is age 12 or older        Passed - Medication is active on med list             "

## 2023-01-05 NOTE — TELEPHONE ENCOUNTER
My chart message sent to patient advising of Dr. Lisa's message below.    Carri PANDYA RN

## 2023-06-10 ENCOUNTER — HEALTH MAINTENANCE LETTER (OUTPATIENT)
Age: 58
End: 2023-06-10

## 2024-11-05 ENCOUNTER — TRANSCRIBE ORDERS (OUTPATIENT)
Dept: OTHER | Age: 59
End: 2024-11-05

## 2024-11-05 DIAGNOSIS — R07.89 CHEST DISCOMFORT: Primary | ICD-10-CM

## 2024-11-07 NOTE — PROGRESS NOTES
"Name:  Cody Swift \"Vish\"  :   1965  MRN:   [id number]  Date of service: 2024  Primary Provider: No Ref-Primary, Physician  Referring Provider: Adams Nolan    PRESENTING INFORMATION   Reason for consultation:  A consultation in the [facility] was requested for Vish, a 59 year old male, for evaluation of family history of arrhythmogenic cardiomyopathy and sudden death.     Vish was unaccompanied to this visit.    History is obtained from Patient and electronic health record. I met with the family at the request of Dr. Adams Nolan to obtain a personal and family history, discuss possible genetic contributions to his symptoms, and to obtain informed consent for genetic testing if indicated.      ASSESSMENT & PLAN  Vish is a 59 year old-year old male with ***. Family history is significant for ***. Prenatal history is ***.     Genetic testing today was offered: ***. The family provided informed consent for the testing, signed with verbal consent (COVID-19)***.     {DMSAMPLE:013936} will be collected and sent to {[employer]:147443} for ***.     {DMINSURANCE2:828275}    After testing is initiated, results will be returned by phone in ***. Follow-up dependent on results    Contact information was provided should any questions arise in the future.       HPI:  Vish is a 59 year old-year old male with ***.    He reports 5-8 episodes of vasovagal syncope since 33yo. He have symptoms when he is getting IVs. He has had seizure like events during these episodes. He reports that his cardiologist was not concerned that this could have been an arrhythmia related to ACM.    Zio pending.    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 He walks often and is interested in doing a 5K run. He did some 5Ks a few years ago.    From cardiologist: 10/2024  1. Cardiac MRI to assess for ARVC, and also for ischemia, given chest discomfort. Also can briefly look at " aortic valve and aortic root dimension (dilated on recent echocardiogram), and try to expedite this.   2. One-week extended Zio to screen for ventricular arrhythmias.   3. Genetic counseling/testing through [facility] at the [facility]. One of Vish's daughters has already made arrangements for genetic counseling with the [facility].  4. If above workup is negative, could also consider coronary calcium scan and lipoprotein (a).  5. Follow up in 6 months.     Echo 10/2024  Interpretation Summary     * Normal left ventricular size and function with a calculated ejection   fraction 59%.     * Mild left ventricular hypertrophy.     * Mild aortic regurgitation.     * Mild mitral regurgitation.     * Mild pulmonary regurgitation.     * Mild dilatation of the aortic root (44 mm) and mild dilatation of the   proximal ascending aorta (39 mm).     * Average global longitudinal strain normal at -20.8%.     * Normal right ventricular size and function.     * No previous transthoracic echocardiogram available for comparison.     cMRI stress 11/11/24  Stress MRI shows no evidence of ischemia.   The gadolinium delay enhancement showed no scar/fibrosis in the   ventricular myocardium.   The left ventricle size is normal. The LV wall thickness is normal. There   is no LV wall motion abnormality. The LV systolic function is normal.   Calculated LVEF is 59%.    The right ventricle is normal in size, wall thickness and wall motion. RV   systolic function is preserved.   Non cardiac findings will be reported separately per radiology.     EKG 10/2024  Intraventricular conduction delay  Otherwise negative EKG    EKG 11/6/2020  Negative    ***    Patient Active Problem List   Diagnosis    Shortness of breath    Other dysphagia    Allergic rhinitis, unspecified seasonality, unspecified trigger    Hoarseness       Pertinent studies/abnormal test results:   No history of genetic testing    Past Medical History:  No  past medical history on file.    Past Surgical History:  No past surgical history on file.     FAMILY HISTORY  Past Medical History:  1. History of recurrent vasovagal syncope for many years. [facility]. Normal stress EKG 2021. Recent normal echo 10/7/2024 through [facility] with the exception of mild aortic and mitral regurgitation, mildly dilated aortic root 4.4 cm.  2. Cholesterol levels 2024 - total cholesterol 231, triglycerides 221, HDL 47, . Normal lytes, BUN and creatinine.   3. Status post hernia surgery.  4. No history of diabetes, DVT or stroke.    Family History: Sudden death of his son Clifford at age 29 as described above. Vish's father is 80 years old and has no heart problems and is very active. His paternal grandfather  suddenly in his 70s and one paternal uncle  suddenly around age 64. Vish's mother had a heart attack at age 73, but may have  from COVID. He has one sister who is in her 50s and is very healthy. Vish has three remaining children, daughters age 34, 32 and age 20; none of whom have any cardiac or other major health issues. Vish's son's (Clifford's) biologic mother is healthy in her 50s, but we do not have complete information on Clifford's mother's family.       A three generation pedigree was obtained today and scanned into the EMR. The following information is significant:    Siblings  Full siblings: ***  Paternal half siblings: ***  Maternal half siblings: ***    Maternal Family  Mother, Data Unavailable:  ***  Maternal grandfather: ***  Maternal grandmother: ***  Maternal aunts/uncles: ***  Maternal cousins: ***  Maternal ancestry: ***    Paternal Family  Father, Data Unavailable: ***  Paternal grandfather: ***  Paternal grandmother: ***  Paternal aunts/uncles: ***  Paternal cousins: ***  Paternal ancestry: ***    The family history is otherwise negative for ***, hearing loss, vision loss, intellectual disability,  developmental delay, short stature, muscleweakness, infertility, multiple miscarriages, stillbirth, birth defects, sudden death, and known genetic disorders. Consanguinity is denied.    SOCIAL HISTORY  Lives with ***  Caregivers: ***  Mother works as ***. Father works as ***.  Mother available for testing: {Yes No Na:[id number]}  Father available for testing: {Yes No Na:690355}  Sibling(s) available for testing: {Yes No [id number]}    DISCUSSION  ***          Approximate Time Spent in Consultation: 70 min          diabetes, DVT or stroke.    Family History: Sudden death of his son Clifford at age 29 as described above. Vsih's father is 80 years old and has no heart problems and is very active. His paternal grandfather  suddenly in his 70s and one paternal uncle  suddenly around age 64. Vish's mother had a heart attack at age 73, but may have  from COVID. He has one sister who is in her 50s and is very healthy. Vish has three remaining children, daughters age 34, 32 and age 20; none of whom have any cardiac or other major health issues. Vish's son's (Clifford's) biologic mother is healthy in her 50s, but we do not have complete information on Clifford's mother's family.       A three generation pedigree was obtained today and scanned into the EMR. The following information is significant:    Children  Son who passed suddenly at 28 due to arrhythmogenic cardiomyopathy.  Autopsy showed right ventricular fibrofatty replacement and left ventricular involvement.  He was extremely athletic.  3 months prior to his passing, he ran 11 miles and was noted to have nausea, blue lips, and dizziness although it was attributed to cold outdoor temperatures.  He did not have any cardiac imaging/evaluations prior to this. Genetic testing was sent via the medical examiner.  The [facility] comprehensive cardiomyopathy, arrhythmia, and sudden unexplained death in epilepsy panel was sent.  It was nondiagnostic.  3 variants of uncertain significance were identified in TNNT2, LPCXF4E, and GZGTZ6G0.    Daughter who is well.  She has had 1 syncopal episode.  Echo was normal.  She may have a cardiac MRI in future.  Daughter who has dizziness with exercise.  Cardiac evaluation is planned      Siblings  Full siblings: Sister who is well.  It is unknown if she has had any cardiac screening  Paternal half siblings: None  Maternal half siblings: None    Maternal Family  Mother, Data Unavailable: History of A-fib diagnosed at 45.  She passed at 73 from  "COVID/complications of heart valve replacement  Maternal grandfather: Passed due to ?Old age  Maternal grandmother: Passed due to ?Old age  Maternal aunts/uncles: uncle who has dementia.  Another uncle who has ?old age  Maternal cousins: Well  Maternal ancestry: Deferred    Paternal Family  Father, Data Unavailable: In his eighties.  It is unknown if he has had any cardiac screening  Paternal grandfather: Passed in his sleep in his sixties.  No known heart disease.  Is unknown if he had an autopsy.  He had a history of smoking  Paternal grandmother: Passed in her nineties  Paternal aunts/uncles: Uncle who passed suddenly in his sixties.  He was found in his bed.  Other details are known.  Another uncle had a \"massive heart attack\" in his fifties.  He is currently in his eighties.  3 other uncles of unknown health  Paternal cousins: Well  Paternal ancestry: Deferred    The family history is otherwise negative for cardiomyopathy, arrhythmia, seizures, syncope, palpitations, dizziness, chest pain, sudden death, and known genetic disorders. Consanguinity is denied.    DISCUSSION  Genetics  Today we reviewed that our genetic material or DNA is responsible for how our bodies grow and develop. It can be thought of as an instruction manual. This instruction manual is made up of chapters called genes. Our genes are inherited on structures called chromosomes, of which we have 23 pairs for a total of 46. For each chromosome pair, one copy is inherited from the mother and one is inherited from the father. The chromosome pairs are numbered from 1 to 22, and the 23rd pair of chromsomes is called the sex chromsomes. These determine if we are a male or female.     Changes in the chromosomes or in the DNA sequence of a gene can cause the signs and symptoms of a genetic condition because the instructions it is providing to the body have been altered. This can be a small spelling error in the gene, a large duplicated piece of " information, or a large missing piece of information.     Cardiomyopathy  There are various types of cardiomyopathies, which are disorders of the heart muscle. There are multiple types of cardiomyopathy depending on the changes to the heart muscle (e.g. dilated, hypertrophic, and restrictive). Cardiomyopathy is a disease of the heart (cardiac) muscle that reduces the heart's ability to provide oxygen-rich blood to the body. It can develop at any time throughout life.    Symptoms are variable, even within the same family. Some individuals may not develop symptoms despite having the genetic cause for cardiomyopathy (this is called reduced penetrance). Other people with familial cardiomyopathy may experience chest pain; shortness of breath, especially with physical exertion; a sensation of fluttering or pounding in the chest (palpitations); lightheadedness; dizziness; and fainting. Some people have a heart murmur. Significant health risks exist including arrhythmias, increased risk of sudden death, or heart failure, which may require heart transplantation.    Genetics of Cardiomyopathy and Genetic Testing  There are both genetic and non-genetic causes of cardiomyopathy. Non-genetic causes include blockage of blood to the heart muscle (ischemic cardiomyopathy), significant drug/alcohol abuse, being pregnant, and infection. There are over 100 genes that can cause cardiomyopathy as well. Identifying the cause of the cardiomyopathy is crucial in order to identify the cause, provide information about what to expect, identify the best treatment path, and inform the risk to relatives.    The possible results of genetic testing were discussed including a positive, negative, or variant of uncertain significance.     One possibility is a variant could be seen which is known to cause cardiomyopathy.  This is considered a positive result.  A positive result may provide more information on appropriate clinical management and may  provide information on additional potential health risks associated with the diagnosis.  A positive result can also have implications for the health and reproductive risks of other relatives.  It is also possible that no variants are found that explain the patient's cardiomyopathy.  This is considered a negative result.  A negative result would not completely rule out a possible genetic cause.  Not all changes in our genes cause disease.  Sometimes, it can be difficult for the laboratory to determine whether or not a change that is found contributes to the patient's symptoms.  If the meaning of a particular gene change is unknown, the lab classifies the result as a variant of unknown significance (VUS). Follow-up testing of relatives may be beneficial in clarifying the meaning of this result.    See A/P for details of Clifford's testing    Billing  No charge per [facility]     Familial Screening and Testing  Cardiac evaluation (cardiac imaging and EKGs) are recommended for first degree relatives. Frequency of screening is depend on age and signs of abnormal heart function, and are determined by the cardiologist.    Individuals who have a first-degree relative who have a pathogenic cardiomyopathy variant should be offered genetic testing. If they test negative for the disease-causing variant, they do not need to be followed by cardiology at this time. They should seek a clinical evaluation if they develop symptoms in the future and should check back with us every year to ensure the variant classification has not since changed.            Approximate Time Spent in Consultation: 70 min

## 2024-11-14 ENCOUNTER — VIRTUAL VISIT (OUTPATIENT)
Dept: CONSULT | Facility: CLINIC | Age: 59
End: 2024-11-14
Attending: GENETIC COUNSELOR, MS
Payer: COMMERCIAL

## 2024-11-14 DIAGNOSIS — Z82.49 FAMILY HISTORY OF ARRHYTHMOGENIC RIGHT VENTRICULAR CARDIOMYOPATHY: Primary | ICD-10-CM

## 2024-11-14 DIAGNOSIS — R07.89 CHEST DISCOMFORT: ICD-10-CM

## 2024-11-14 DIAGNOSIS — Z82.41 FAMILY HISTORY OF SUDDEN CARDIAC DEATH IN SON: ICD-10-CM

## 2024-11-14 PROCEDURE — 96040 HC GENETIC COUNSELING, EACH 30 MINUTES: CPT | Mod: GT,95 | Performed by: GENETIC COUNSELOR, MS

## 2024-11-14 PROCEDURE — 999N000069 HC STATISTIC GENETIC COUNSELING, < 16 MIN: Mod: GT,95 | Performed by: GENETIC COUNSELOR, MS

## 2024-11-14 NOTE — LETTER
"2024      RE: Cody Swift  [address] [facility] [id number]     Dear Colleague,    Thank you for the opportunity to participate in the care of your patient, Cody Swift, at the [facility] at [facility]. Please see a copy of my visit note below.    Name:  Cody Swift \"Vish\"  :   1965  MRN:   [id number]  Date of service: 2024  Primary Provider: No Ref-Primary, Physician  Referring Provider: Adams Nolan    PRESENTING INFORMATION   Reason for consultation:  A consultation in the Genetics Clinic was requested for Vish, a 59 year old male, for evaluation of family history of arrhythmogenic cardiomyopathy and sudden death.     Vish was unaccompanied to this visit.    History is obtained from Patient and electronic health record. I met with the family at the request of Dr. Adams Nolan to obtain a personal and family history, discuss possible genetic contributions to his symptoms, and to obtain informed consent for genetic testing if indicated.      ASSESSMENT & PLAN  Vish is a 59 year old-year old male with a family history of sudden death due to arrhythmogenic cardiomyopathy (right and left ventricular involvement with fibrofatty replacement) in his son, Clifford.       Today we reviewed the different subtypes of cardiomyopathy including hypertrophic cardiomyopathy and arrhythmogenic cardiomyopathy, previously called arrhythmogenic right ventricular dysplasia (ARVC).  When the research into the genetic causes for cardiomyopathies was first being done, it was thought that a person would have one genetic variant that would cause an increased risk of a specific type of cardiomyopathy (e.g. MYBPC3 increase the risk of hypertrophic cardiomyopathy and PKP2 increased the risk of arrhythmogenic cardiomyopathy).  We have now learned more about the complicated nature of genetic " cardiomyopathies. There are genes that can cause different types of cardiomyopathy within the same family.  In addition, we now know that some individuals have multiple genetic variants +/- environmental risk factors that caused their cardiomyopathy.     Genetic testing was performed on Clifford (postmortem): 3 variants of uncertain significance were identified.  A variant of uncertain significance does not provide a genetic diagnosis because we have insufficient information to know if this variant causes health concerns.  Upon review of these variants, none of them are associated with arrhythmogenic cardiomyopathy at this time.  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 This gene is associated with hypertrophic, dilated, or left ventricular noncompaction cardiomyopathy. Some labs consider this variant likely pathogenic, because it is been seen in individuals with hypertrophic cardiomyopathy.  [facility] considers this variant uncertain because it has also been seen in numerous older individuals who are healthy and the individuals with hypertrophic cardiomyopathy sometimes at other variants in other genes that could have contributed to their cardiomyopathy. At this time, the TNNT2 variant does not provide a definitive cause for Clifford's arrhythmogenic cardiomyopathy, although it may have partially contributed.       We can consider genetic testing for risk to see if this variant was inherited from him, mother, or de neil.  Based on population prevalence, we expect that this variant is inherited.  If this variant is pathogenic, it would be most likely that it would cause a hypertrophic cardiomyopathy rather than an arrhythmogenic cardiomyopathy.  Because we do not know what caused his son's arrhythmogenic cardiomyopathy, we cannot exclude Vish from cardiomyopathy screening, regardless of genetic test results.  He expressed understanding.  Genetic testing today was offered: TNNT2  next-generation sequencing at [facility]. The family provided informed consent for the testing, signed with verbal consent (COVID-19).     buccal sample will be collected and sent to [facility] for TNNT2 VUS testing.     After testing is initiated, results will be returned by phone in 3 weeks. Follow-up dependent on results.  He will need arrhythmogenic cardiomyopathy screening regardless of test results.  6    Contact information was provided should any questions arise in the future.       HPI:  Vish is a 59 year old-year old male with a family history of arrhythmogenic cardiomyopathy in his son, who passed suddenly at 28.  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    Vish reports 5-8 episodes of vasovagal syncope since 33yo. He have symptoms when he is getting IVs. He has had seizure-like events during these episodes. He reports that his cardiologist was not concerned that this could have been an arrhythmia related to ACM.  Vish had an echo in October which showed mild left ventricular hypertrophy.  Cardiac MRI was performed which reported a normal left ventricular size and wall thickness.  Function was normal.  There was no late gadolinium enhancement suggestive of fibrosis or scarring. Zio pending.    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 He walks often and is interested in doing a 5K run. He did some 5Ks a few years ago.      From cardiologist: 10/2024  1. Cardiac MRI to assess for ARVC, and also for ischemia, given chest discomfort. Also can briefly look at aortic valve and aortic root dimension (dilated on recent echocardiogram), and try to expedite this.   2. One-week extended Zio to screen for ventricular arrhythmias.   3. Genetic counseling/testing through Kesha Robles at the  [facility]. One of iVsh's daughters has already made arrangements for genetic counseling with the [facility] [place], [place].  4. If above workup is negative, could also consider coronary calcium scan and lipoprotein (a).  5. Follow up in 6 months.     Echo 10/2024  Interpretation Summary     * Normal left ventricular size and function with a calculated ejection   fraction 59%.     * Mild left ventricular hypertrophy.     * Mild aortic regurgitation.     * Mild mitral regurgitation.     * Mild pulmonary regurgitation.     * Mild dilatation of the aortic root (44 mm) and mild dilatation of the   proximal ascending aorta (39 mm).     * Average global longitudinal strain normal at -20.8%.     * Normal right ventricular size and function.     * No previous transthoracic echocardiogram available for comparison.     cMRI stress 11/11/24  Stress MRI shows no evidence of ischemia.   The gadolinium delay enhancement showed no scar/fibrosis in the   ventricular myocardium.   The left ventricle size is normal. The LV wall thickness is normal. There   is no LV wall motion abnormality. The LV systolic function is normal.   Calculated LVEF is 59%.    The right ventricle is normal in size, wall thickness and wall motion. RV   systolic function is preserved.   Non cardiac findings will be reported separately per radiology.     EKG 10/2024  Intraventricular conduction delay  Otherwise negative EKG    EKG 11/6/2020  Negative    Patient Active Problem List   Diagnosis     Shortness of breath     Other dysphagia     Allergic rhinitis, unspecified seasonality, unspecified trigger     Hoarseness       Pertinent studies/abnormal test results:   No history of genetic testing    Past Medical History:  No past medical history on file.    Past Surgical History:  No past surgical history on file.     FAMILY HISTORY  Past Medical History:  1. History of recurrent vasovagal syncope for many years. He saw a cardiologist through [facility]  10 or 15 years ago. Normal stress EKG 2021. Recent normal echo 10/7/2024 through [facility] with the exception of mild aortic and mitral regurgitation, mildly dilated aortic root 4.4 cm.  2. Cholesterol levels 2024 - total cholesterol 231, triglycerides 221, HDL 47, . Normal lytes, BUN and creatinine.   3. Status post hernia surgery.  4. No history of diabetes, DVT or stroke.    Family History: Sudden death of his son Clifford at age 29 as described above. Vish's father is 80 years old and has no heart problems and is very active. His paternal grandfather  suddenly in his 70s and one paternal uncle  suddenly around age 64. Vish's mother had a heart attack at age 73, but may have  from COVID. He has one sister who is in her 50s and is very healthy. Vish has three remaining children, daughters age 34, 32 and age 20; none of whom have any cardiac or other major health issues. Vish's son's (Clifford's) biologic mother is healthy in her 50s, but we do not have complete information on Clifford's mother's family.       A three generation pedigree was obtained today and scanned into the EMR. The following information is significant:    Children  Son who passed suddenly at 28 due to arrhythmogenic cardiomyopathy.  Autopsy showed right ventricular fibrofatty replacement and left ventricular involvement.  He was extremely athletic.  3 months prior to his passing, he ran 11 miles and was noted to have nausea, blue lips, and dizziness although it was attributed to cold outdoor temperatures.  He did not have any cardiac imaging/evaluations prior to this. Genetic testing was sent via the medical examiner.  The [facility] comprehensive cardiomyopathy, arrhythmia, and sudden unexplained death in epilepsy panel was sent.  It was nondiagnostic.  3 variants of uncertain significance were identified in TNNT2, LHSVI1A, and WYSNG1V9.    Daughter who is well.  She has had 1 syncopal episode.  Echo was normal.  She may have a  "cardiac MRI in future.  Daughter who has dizziness with exercise.  Cardiac evaluation is planned      Siblings  Full siblings: Sister who is well.  It is unknown if she has had any cardiac screening  Paternal half siblings: None  Maternal half siblings: None    Maternal Family  Mother, Data Unavailable: History of A-fib diagnosed at 45.  She passed at 73 from COVID/complications of heart valve replacement  Maternal grandfather: Passed due to ?Old age  Maternal grandmother: Passed due to ?Old age  Maternal aunts/uncles: uncle who has dementia.  Another uncle who has ?old age  Maternal cousins: Well  Maternal ancestry: Deferred    Paternal Family  Father, Data Unavailable: In his eighties.  It is unknown if he has had any cardiac screening  Paternal grandfather: Passed in his sleep in his sixties.  No known heart disease.  Is unknown if he had an autopsy.  He had a history of smoking  Paternal grandmother: Passed in her nineties  Paternal aunts/uncles: Uncle who passed suddenly in his sixties.  He was found in his bed.  Other details are known.  Another uncle had a \"massive heart attack\" in his fifties.  He is currently in his eighties.  3 other uncles of unknown health  Paternal cousins: Well  Paternal ancestry: Deferred    The family history is otherwise negative for cardiomyopathy, arrhythmia, seizures, syncope, palpitations, dizziness, chest pain, sudden death, and known genetic disorders. Consanguinity is denied.    DISCUSSION  Genetics  Today we reviewed that our genetic material or DNA is responsible for how our bodies grow and develop. It can be thought of as an instruction manual. This instruction manual is made up of chapters called genes. Our genes are inherited on structures called chromosomes, of which we have 23 pairs for a total of 46. For each chromosome pair, one copy is inherited from the mother and one is inherited from the father. The chromosome pairs are numbered from 1 to 22, and the 23rd pair of " chromsomes is called the sex chromsomes. These determine if we are a male or female.     Changes in the chromosomes or in the DNA sequence of a gene can cause the signs and symptoms of a genetic condition because the instructions it is providing to the body have been altered. This can be a small spelling error in the gene, a large duplicated piece of information, or a large missing piece of information.     Cardiomyopathy  There are various types of cardiomyopathies, which are disorders of the heart muscle. There are multiple types of cardiomyopathy depending on the changes to the heart muscle (e.g. dilated, hypertrophic, and restrictive). Cardiomyopathy is a disease of the heart (cardiac) muscle that reduces the heart's ability to provide oxygen-rich blood to the body. It can develop at any time throughout life.    Symptoms are variable, even within the same family. Some individuals may not develop symptoms despite having the genetic cause for cardiomyopathy (this is called reduced penetrance). Other people with familial cardiomyopathy may experience chest pain; shortness of breath, especially with physical exertion; a sensation of fluttering or pounding in the chest (palpitations); lightheadedness; dizziness; and fainting. Some people have a heart murmur. Significant health risks exist including arrhythmias, increased risk of sudden death, or heart failure, which may require heart transplantation.    Genetics of Cardiomyopathy and Genetic Testing  There are both genetic and non-genetic causes of cardiomyopathy. Non-genetic causes include blockage of blood to the heart muscle (ischemic cardiomyopathy), significant drug/alcohol abuse, being pregnant, and infection. There are over 100 genes that can cause cardiomyopathy as well. Identifying the cause of the cardiomyopathy is crucial in order to identify the cause, provide information about what to expect, identify the best treatment path, and inform the risk to  relatives.    The possible results of genetic testing were discussed including a positive, negative, or variant of uncertain significance.     One possibility is a variant could be seen which is known to cause cardiomyopathy.  This is considered a positive result.  A positive result may provide more information on appropriate clinical management and may provide information on additional potential health risks associated with the diagnosis.  A positive result can also have implications for the health and reproductive risks of other relatives.  It is also possible that no variants are found that explain the patient's cardiomyopathy.  This is considered a negative result.  A negative result would not completely rule out a possible genetic cause.  Not all changes in our genes cause disease.  Sometimes, it can be difficult for the laboratory to determine whether or not a change that is found contributes to the patient's symptoms.  If the meaning of a particular gene change is unknown, the lab classifies the result as a variant of unknown significance (VUS). Follow-up testing of relatives may be beneficial in clarifying the meaning of this result.    See A/P for details of Clifford's testing    Billing  No charge per [facility]     Familial Screening and Testing  Cardiac evaluation (cardiac imaging and EKGs) are recommended for first degree relatives. Frequency of screening is depend on age and signs of abnormal heart function, and are determined by the cardiologist.    Individuals who have a first-degree relative who have a pathogenic cardiomyopathy variant should be offered genetic testing. If they test negative for the disease-causing variant, they do not need to be followed by cardiology at this time. They should seek a clinical evaluation if they develop symptoms in the future and should check back with us every year to ensure the variant classification has not since changed.            Approximate Time Spent in  Consultation: 70 min           Please do not hesitate to contact me if you have any questions/concerns.     Sincerely,       Arianna Hernandez, GC

## 2024-12-02 PROBLEM — Z82.41: Status: ACTIVE | Noted: 2024-12-02

## 2024-12-02 PROBLEM — Z82.49 FAMILY HISTORY OF ARRHYTHMOGENIC RIGHT VENTRICULAR CARDIOMYOPATHY: Status: ACTIVE | Noted: 2024-12-02

## 2024-12-05 ENCOUNTER — TELEPHONE (OUTPATIENT)
Dept: CONSULT | Facility: CLINIC | Age: 59
End: 2024-12-05
Payer: COMMERCIAL

## 2024-12-05 NOTE — TELEPHONE ENCOUNTER
Reason for Call  Called Vish to discuss the results of Vish's genetic testing for the variant of uncertain significance in TNNT2.  This was identified in his son, Clifford, who passed at 28 due to arrhythmogenic cardiomyopathy (ARVC).     Results  Vish was found to have the same TNNT2 variant of uncertain significance as his son. The classification of this variant is challenging because it is more common in healthy populations than expected for a pathogenic variant, and many of these individuals were over 70 years old.  This variant has also been reported in individuals with hypertrophic cardiomyopathy and is considered pathogenic by other labs.  Due to this conflicting evidence, more information will be needed to determine if this variant causes cardiomyopathy or not.  [facility] reports that they would need to see evidence of pathogenicity from a large case control study or meta-analysis to resolve the uncertainty with this variant.  Notably, pathogenic variants in this gene are NOT known to cause arrhythmogenic cardiomyopathy (RV>LV) at this time.  If this variant is pathogenic, it does not currently explain Vish's son's ARVC.    We do not currently know what caused Vish's son's cardiomyopathy.  In addition, because this TNNT2 variant is uncertain, we cannot use this to predict whether or not Vish (or other family members) will develop cardiomyopathy.  They need to continue cardiomyopathy screening based on the family history of arrhythmogenic cardiomyopathy. In future, we may learn more about this variant.  We will reach out to Vish if [facility] sends us an update on this variant.  In the interim, if other individuals in the family are found to have cardiomyopathy, genetic testing for them can be considered.    Plan  Follow-up per cardiology (~April 28 2025). Results note faxed to Dr. Nolan's office  Contact us in ~2 years to inquire about variant updates by calling Arianna Hernandez at [phone].  We will reach out to  Vish jurado [facility] notifies us of an updated classification.  No additional questions or concerns. Contact information provided    Arianna Hernandez [facility]  Genetic Counselor   [facility]   Phone: [phone]

## 2025-05-24 ENCOUNTER — HEALTH MAINTENANCE LETTER (OUTPATIENT)
Age: 60
End: 2025-05-24